# Patient Record
Sex: FEMALE | Race: WHITE
[De-identification: names, ages, dates, MRNs, and addresses within clinical notes are randomized per-mention and may not be internally consistent; named-entity substitution may affect disease eponyms.]

---

## 2017-09-01 ENCOUNTER — HOSPITAL ENCOUNTER (EMERGENCY)
Dept: HOSPITAL 74 - JER | Age: 37
LOS: 1 days | Discharge: HOME | End: 2017-09-02
Payer: COMMERCIAL

## 2017-09-01 VITALS — BODY MASS INDEX: 26.7 KG/M2

## 2017-09-01 VITALS — TEMPERATURE: 97.7 F

## 2017-09-01 DIAGNOSIS — R51: Primary | ICD-10-CM

## 2017-09-01 LAB
ALBUMIN SERPL-MCNC: 3.7 G/DL (ref 3.4–5)
ALP SERPL-CCNC: 124 U/L (ref 45–117)
ALT SERPL-CCNC: 31 U/L (ref 12–78)
ANION GAP SERPL CALC-SCNC: 7 MMOL/L (ref 8–16)
AST SERPL-CCNC: 37 U/L (ref 15–37)
BASOPHILS # BLD: 0.7 % (ref 0–2)
BILIRUB SERPL-MCNC: 0.3 MG/DL (ref 0.2–1)
CALCIUM SERPL-MCNC: 8.8 MG/DL (ref 8.5–10.1)
CO2 SERPL-SCNC: 27 MMOL/L (ref 21–32)
CREAT SERPL-MCNC: 0.6 MG/DL (ref 0.55–1.02)
DEPRECATED RDW RBC AUTO: 16.2 % (ref 11.6–15.6)
EOSINOPHIL # BLD: 1.4 % (ref 0–4.5)
GLUCOSE SERPL-MCNC: 82 MG/DL (ref 74–106)
MCH RBC QN AUTO: 27.8 PG (ref 25.7–33.7)
MCHC RBC AUTO-ENTMCNC: 33.5 G/DL (ref 32–36)
MCV RBC: 82.9 FL (ref 80–96)
NEUTROPHILS # BLD: 42.2 % (ref 42.8–82.8)
PLATELET # BLD AUTO: 240 K/MM3 (ref 134–434)
PMV BLD: 9 FL (ref 7.5–11.1)
PROT SERPL-MCNC: 8.1 G/DL (ref 6.4–8.2)
WBC # BLD AUTO: 4.5 K/MM3 (ref 4–10)

## 2017-09-01 NOTE — PDOC
History of Present Illness





- General


History Source: Patient


Exam Limitations: No Limitations





- History of Present Illness


Initial Comments: 





17 21:21


The patient is a 36 year old female, with no significant past medical history, 

who presents to the emergency room complaining of 3 weeks of an intermittent, 

sharp, stabbing headache at her temples bilaterally. The headache wraps around 

her head in a headband like manner radiating behind the ear and down her jaw. 

She reports that the headache became significantly worse 1 week ago and is now 

preventing her from sleeping at night. She notes that her eyes feel tired but 

denies blurred vision or any changes in vision. She has been taking 2 Tylenol 3 

times daily with mild relief. She notes a rash behind her ears bilaterally and 

has been seeing a dermatologist. 





Denies lightheadedness, dizziness. 


Denies any recent illness. 


Denies fever, chills, nausea, vomiting.





Allergies: none reported 








<Damari Alba - Last Filed: 17 00:35>





<Nury Gardner - Last Filed: 17 01:01>





- General


Chief Complaint: Headache


Stated Complaint: HEADACHE PAIN


Time Seen by Provider: 17 20:34





Past History





<Damari Alba - Last Filed: 17 00:35>





- Past Medical History


Anemia: No


Asthma: No


Cancer: No


Cardiac Disorders: No


Diabetes: No


HTN: No


Seizures: No


Thyroid Disease: No





- Reproductive History


 (#): 4


Para: 2


Spontaneous : 1





- Immunization History


Immunization Up to Date: Yes





- Psycho/Social/Smoking Cessation Hx


Anxiety: No


Suicidal Ideation: No


Smoking Status: No


Smoking History: Never smoked


Have you smoked in the past 12 months: No


Number of Cigarettes Smoked Daily: 0


Hx Alcohol Use: No


Drug/Substance Use Hx: No


Hx Substance Use Treatment: No





<Nury Gardner - Last Filed: 17 01:01>





- Past Medical History


Allergies/Adverse Reactions: 


 Allergies











Allergy/AdvReac Type Severity Reaction Status Date / Time


 


No Known Allergies Allergy   Verified 17 19:49














**Review of Systems





- Review of Systems


Able to Perform ROS?: Yes


Comments:: 





17 21:21


GENERAL/CONSTITUTIONAL: No fever or chills. No weakness.


HEAD, EYES, EARS, NOSE AND THROAT: No change in vision. No ear pain or 

discharge. No sore throat.


CARDIOVASCULAR: No chest pain or shortness of breath.


RESPIRATORY: No cough, wheezing, or hemoptysis.


GASTROINTESTINAL: No nausea, vomiting, diarrhea or constipation.


GENITOURINARY: No dysuria, frequency, or change in urination.


MUSCULOSKELETAL: No joint or muscle swelling or pain. No neck or back pain.


SKIN: No rash


NEUROLOGIC: +headache. No vertigo, loss of consciousness, or change in strength/

sensation.


ENDOCRINE: No increased thirst. No abnormal weight change.


HEMATOLOGIC/LYMPHATIC: No anemia, easy bleeding, or history of blood clots.


ALLERGIC/IMMUNOLOGIC: No hives or skin allergy.








<Damari Alba - Last Filed: 17 00:35>





*Physical Exam





- Vital Signs


 Last Vital Signs











Temp Pulse Resp BP Pulse Ox


 


 97.7 F   54 L  18   144/87   99 


 


 17 19:47  17 19:47  17 19:47  17 19:47  17 19:47














- Physical Exam


Comments: 





17 21:22


GENERAL: Awake, alert, and fully oriented, in no acute distress


HEAD: No signs of trauma


EYES: PERRLA, EOMI, sclera anicteric, conjunctiva clear. No nystagmus 


ENT: Auricles normal inspection, hearing grossly normal, nares patent, 

oropharynx clear without exudates. Moist mucosa


NECK: Normal ROM, supple, no lymphadenopathy, JVD, or masses


LUNGS: Breath sounds equal, clear to auscultation bilaterally.  No wheezes, and 

no crackles


HEART: Regular rate and rhythm, normal S1 and S2, no murmurs, rubs or gallops


ABDOMEN: Soft, nontender, normoactive bowel sounds.  No guarding, no rebound.  

No masses


EXTREMITIES: Normal range of motion, no edema.  No clubbing or cyanosis. No 

cords, erythema, or tenderness


NEUROLOGICAL: Cranial nerves II through XII grossly intact.  Normal speech, 

normal gait


SKIN: Eczematous dry rash behind the ears bilaterally with the right worse than 

the left.Warm, Dry, normal turgor, no lesions noted.











<Damari Alba - Last Filed: 17 00:35>





- Vital Signs


 Last Vital Signs











Temp Pulse Resp BP Pulse Ox


 


 97.7 F   54 L  18   144/87   99 


 


 17 19:47  17 19:47  17 19:47  17 19:47  17 19:47














<Nury Gardner - Last Filed: 17 01:01>





ED Treatment Course





- LABORATORY


CBC & Chemistry Diagram: 


 17 21:20





 17 21:20





- RADIOLOGY


Radiograph Interpretation: 





17 00:36


EXAM: CT BRAIN WITHOUT CONTRAST AND CTA HEAD HISTORY: Rule out Takayasu's 

arthritis 


COMPARISON: None. 


FINDINGS:CT brain:


The ventricular system is midline and nondilated. The sulcal pattern is normal 

for the patient's age. There is no bleed, mass, extra-axial fluid collection or 

mass effect. No skull fracture or skull lesion is identified. The visualized 

paranasal sinuses and mastoid air cells are clear. CT angiogram brain: The 

right anterior, middle and posterior cerebral arteries appear normal without 

clot, occlusion, high-grade stenosis, vessel irregularity or discrete aneurysm 

formation. Distal vertebral basilar system is unremarkable. Distal right and 

left internal carotid arteries are normal. No discrete areas of abnormally 

increased or decreased enhancement.


 IMPRESSION: 


Normal CT brain Normal CT angiogram brain. 


THIS DOCUMENT HAS BEEN ELECTRONICALLY SIGNED 


Gaudencio Jorge MD 2017 00:15 WADE LAUREN








<Damari Alba - Last Filed: 17 00:35>





- LABORATORY


CBC & Chemistry Diagram: 


 17 21:20





 17 21:20





<Nury Gardner - Last Filed: 17 01:01>





Medical Decision Making





- Medical Decision Making





17 00:58


Pt has a  normal CTA.  I sent a ddimer accidentally instead of Sed rate. Later 

realized my mistake and sent a sed rate. Both are elevated.  Pt has normal Labs 

otherwise.  She had an elevated sed rate in the past. Unclear if she has any 

other rheumatologic disease.


She is at decreased risk of temporal arteritis, as she is only 36 years old.





Pt will be asked to follow with her PMD and to follow with a rheumatologist.





<Nury Gardner - Last Filed: 17 01:01>





*DC/Admit/Observation/Transfer





- Attestations


Scribe Attestion: 





17 21:22





Documentation prepared by SIERRA Huff, acting as medical scribe 

for Nury Gardner MD.





<Damari Alba - Last Filed: 17 00:35>





- Discharge Dispostion


Admit: No





<Nury Gardner - Last Filed: 17 01:01>


Diagnosis at time of Disposition: 


 Headache





- Discharge Dispostion


Disposition: HOME


Condition at time of disposition: Stable





- Patient Instructions


Printed Discharge Instructions:  DI for Headache

## 2017-09-02 VITALS — SYSTOLIC BLOOD PRESSURE: 141 MMHG | DIASTOLIC BLOOD PRESSURE: 72 MMHG | HEART RATE: 50 BPM

## 2018-12-22 ENCOUNTER — HOSPITAL ENCOUNTER (EMERGENCY)
Dept: HOSPITAL 74 - JER | Age: 38
Discharge: HOME | End: 2018-12-22
Payer: COMMERCIAL

## 2018-12-22 VITALS — HEART RATE: 61 BPM | DIASTOLIC BLOOD PRESSURE: 82 MMHG | SYSTOLIC BLOOD PRESSURE: 139 MMHG | TEMPERATURE: 98.6 F

## 2018-12-22 VITALS — BODY MASS INDEX: 25.9 KG/M2

## 2018-12-22 DIAGNOSIS — R52: ICD-10-CM

## 2018-12-22 DIAGNOSIS — B97.89: ICD-10-CM

## 2018-12-22 DIAGNOSIS — J02.9: Primary | ICD-10-CM

## 2018-12-22 LAB
APPEARANCE UR: CLEAR
BILIRUB UR STRIP.AUTO-MCNC: NEGATIVE MG/DL
COLOR UR: YELLOW
KETONES UR QL STRIP: NEGATIVE
LEUKOCYTE ESTERASE UR QL STRIP.AUTO: NEGATIVE
NITRITE UR QL STRIP: NEGATIVE
PH UR: 6 [PH] (ref 5–8)
PROT UR QL STRIP: NEGATIVE
PROT UR QL STRIP: NEGATIVE
SP GR UR: 1.02 (ref 1.01–1.03)
UROBILINOGEN UR STRIP-MCNC: NEGATIVE MG/DL (ref 0.2–1)

## 2018-12-22 NOTE — PDOC
Attending Attestation





- Resident


Resident Name: Tha Parmar





- ED Attending Attestation


I have performed the following: I have examined & evaluated the patient, The 

case was reviewed & discussed with the resident, I agree w/resident's findings 

& plan





- HPI


HPI: 





12/22/18 06:09


Pt comes with throat pain, facial pain x a few days. Her son had an ear 

infection and cough and he was treated with abx. and fever since yesterday.





- Physicial Exam


PE: 





12/22/18 06:10


Agree with resident exam; pt comes with facial pain; likely sinusitis.





- Medical Decision Making





12/22/18 06:12


Home with 500 zithromax daily x 3 days.





One zith here and tylenol.  Pt is feeling better; flu swab sent; result 

pending.  I will check it later.


12/22/18 19:28


Flu negative and UA negative.

## 2018-12-22 NOTE — PDOC
History of Present Illness





- General


Chief Complaint: Pain, Acute


Stated Complaint: R ARM PAIN/THROAT PAIN


Time Seen by Provider: 18 03:51


History Source: Patient,  Used


Exam Limitations: Language Barrier





- History of Present Illness


Initial Comments: 





18 05:37


Pt. is a 38 y.o. F with no PMHx. presents with sore throat, right arm pain and 

numbness on the right side. Pt. states that she has been having progressively 

worsening sore throat and arm pain for 2 weeks. Pt. states that she has a cough

, muscle pain, joint pain all over most prominent in right shoulder. Pt. states 

that she has right-sided numbness from the right shoulder to the right knee 

with muscle weakness 2/2 pain. Pt. states that she did not have the Flu vaccine 

this year. She endorses green sputum production with cough and bilateral ear 

pain. Pt. reports a fever to 100.6 at home.Pt. states she took Motrin but that 

it only helped the fever not the pain. Pt. endorses normal menstruation. Pt. 

denies any changes in her bowel or urinary habits. Pt. denies any vomiting, 

changes in vision dysuria or polyuria.   





Past History





- Travel


Traveled outside of the country in the last 30 days: No


Close contact w/someone who was outside of country & ill: No





- Past Medical History


Allergies/Adverse Reactions: 


 Allergies











Allergy/AdvReac Type Severity Reaction Status Date / Time


 


No Known Allergies Allergy   Verified 18 03:50











Home Medications: 


Ambulatory Orders





Acetaminophen [Tylenol] 650 mg PO PRN 18 


Azithromycin [Zithromax Tri-Prem (3 DAYS) -] 500 mg PO DAILY #3 tablet 18 








Anemia: No


Asthma: No


Cancer: No


Cardiac Disorders: No


COPD: Yes


Diabetes: No


HTN: No


Seizures: No


Thyroid Disease: No





- Reproductive History


 (#): 4


Para: 2


Spontaneous : 1





- Immunization History


Immunization Up to Date: Yes





- Suicide/Smoking/Psychosocial Hx


Smoking Status: No


Smoking History: Never smoked


Have you smoked in the past 12 months: No


Number of Cigarettes Smoked Daily: 0


Information on smoking cessation initiated: No


Hx Alcohol Use: No


Drug/Substance Use Hx: No


Hx Substance Use Treatment: No





**Review of Systems





- Review of Systems


Able to Perform ROS?: Yes


Is the patient limited English proficient: Yes


Constitutional: Yes: Fever, Malaise, Weakness


HEENTM: Yes: Ear Pain, Throat Pain


Respiratory: Yes: Cough


Cardiac (ROS): No: Chest Pain, Irregular Heart Rate


ABD/GI: Yes: Poor Fluid Intake.  No: Constipated, Diarrhea, Difficulty 

Swallowing


: No: Dysuria, Discharge, Frequency, Flank Pain, Hematuria, Incontinence


Musculoskeletal: Yes: Back Pain, Joint Pain, Muscle Pain, Muscle Weakness, Neck 

Pain


Integumentary: Yes: Erythema.  No: Change in Color


Neurological: Yes: Numbness, Weakness


Hematologic/Lymphatic: Yes: Lymph Node Abnormalities (left submandibular LAD)





*Physical Exam





- Vital Signs


 Last Vital Signs











Temp Pulse Resp BP Pulse Ox


 


 98.6 F   61   18   139/82   99 


 


 18 03:50  18 03:50  18 03:50  18 03:50  18 03:50














- Physical Exam


General Appearance: Yes: Nourished, Appropriately Dressed, Apparent Distress, 

Mild Distress


HEENT: positive: Normal ENT Inspection, Normal Voice, Symmetrical, Pharynx 

Normal, Hearing Grossly Normal.  negative: Tonsillar Exudate, Tonsillar Erythema

, Hearing Decreased, TM Bulging, TM Dull, TM Erythema


Neck: positive: Tender, Trachea midline, Normal Thyroid, Supple, 

Lymphadenopathy (R) (submandibular), Tender lateral


Respiratory/Chest: positive: Lungs Clear, Normal Breath Sounds.  negative: 

Accessory Muscle Use, Labored Respiration, Crackles, Wheezing


Cardiovascular: positive: Regular Rhythm, Regular Rate, S1, S2.  negative: Edema


Vascular Pulses: Dorsalis-Pedis (R): 2+, Doralis-Pedis (L): 2+


Gastrointestinal/Abdominal: positive: Normal Bowel Sounds


Lymphatic: positive: Adenopathy (submandibular )


Musculoskeletal: positive: Decreased Range of Motion (right shoulder ).  

negative: CVA Tenderness


Extremity: positive: Tender.  negative: Pedal Edema, Swelling, Calf Tenderness


Integumentary: positive: Normal Color, Dry, Warm.  negative: Swelling, 

Ecchymosis


Neurologic: positive: Fully Oriented, Alert, Normal Response, Motor Strength 5/5

, Numbness





Moderate Sedation





- Procedure Monitoring


Vital Signs: 


Procedure Monitoring Vital Signs











Temperature  98.6 F   18 03:50


 


Pulse Rate  61   18 03:50


 


Respiratory Rate  18   18 03:50


 


Blood Pressure  139/82   18 03:50


 


O2 Sat by Pulse Oximetry (%)  99   18 03:50











*DC/Admit/Observation/Transfer


Diagnosis at time of Disposition: 


 Pain, Sore throat (viral)








- Discharge Dispostion


Disposition: HOME


Condition at time of disposition: Stable





- Prescriptions


Prescriptions: 


Azithromycin [Zithromax Tri-Prem (3 DAYS) -] 500 mg PO DAILY #3 tablet





- Referrals





- Patient Instructions


Printed Discharge Instructions:  How to Avoid a Cold or Flu


Additional Instructions: 


You came in for sore throat, right shoulder pain and right-sided numbness.





We evaluated you for the flu.





Please follow up with your Primary Care Physician within 1 week.





Please go to the ER if you are experiencing worsening right arm pain, worsening 

numbness, chest pain, worsening shortness of breath or any concerning symptoms. 





- Post Discharge Activity

## 2018-12-22 NOTE — PDOC
History of Present Illness





- General


Chief Complaint: Pain, Acute


Stated Complaint: R ARM PAIN/THROAT PAIN


Time Seen by Provider: 18 03:51





Past History





- Past Medical History


Allergies/Adverse Reactions: 


 Allergies











Allergy/AdvReac Type Severity Reaction Status Date / Time


 


No Known Allergies Allergy   Verified 18 03:50











Home Medications: 


Ambulatory Orders





NK [No Known Home Medication]  18 








Anemia: No


Asthma: No


Cancer: No


Cardiac Disorders: No


Diabetes: No


HTN: No


Seizures: No


Thyroid Disease: No





- Reproductive History


 (#): 4


Para: 2


Spontaneous : 1





- Immunization History


Immunization Up to Date: Yes





- Suicide/Smoking/Psychosocial Hx


Smoking Status: No


Smoking History: Never smoked


Have you smoked in the past 12 months: No


Number of Cigarettes Smoked Daily: 0


Hx Alcohol Use: No


Drug/Substance Use Hx: No


Hx Substance Use Treatment: No